# Patient Record
Sex: FEMALE | Race: WHITE | NOT HISPANIC OR LATINO
[De-identification: names, ages, dates, MRNs, and addresses within clinical notes are randomized per-mention and may not be internally consistent; named-entity substitution may affect disease eponyms.]

---
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
## 2023-03-06 DIAGNOSIS — Z76.0 ENCOUNTER FOR ISSUE OF REPEAT PRESCRIPTION: ICD-10-CM

## 2023-03-06 RX ORDER — NORETHINDRONE ACETATE AND ETHINYL ESTRADIOL 1MG-20(21)
1-20 KIT ORAL DAILY
Qty: 1 | Refills: 0 | Status: ACTIVE | COMMUNITY
Start: 2023-03-06 | End: 1900-01-01

## 2023-04-10 ENCOUNTER — LABORATORY RESULT (OUTPATIENT)
Age: 18
End: 2023-04-10

## 2023-04-10 ENCOUNTER — APPOINTMENT (OUTPATIENT)
Dept: OBGYN | Facility: CLINIC | Age: 18
End: 2023-04-10
Payer: COMMERCIAL

## 2023-04-10 VITALS
HEIGHT: 62 IN | BODY MASS INDEX: 22.08 KG/M2 | DIASTOLIC BLOOD PRESSURE: 75 MMHG | WEIGHT: 120 LBS | HEART RATE: 109 BPM | SYSTOLIC BLOOD PRESSURE: 112 MMHG

## 2023-04-10 DIAGNOSIS — B37.9 CANDIDIASIS, UNSPECIFIED: ICD-10-CM

## 2023-04-10 LAB
BILIRUB UR QL STRIP: NEGATIVE
CLARITY UR: CLEAR
COLLECTION METHOD: NORMAL
GLUCOSE UR-MCNC: NEGATIVE
HCG UR QL: 0.2 EU/DL
HGB UR QL STRIP.AUTO: NEGATIVE
KETONES UR-MCNC: NEGATIVE
LEUKOCYTE ESTERASE UR QL STRIP: NORMAL
NITRITE UR QL STRIP: NEGATIVE
PH UR STRIP: 6
PROT UR STRIP-MCNC: NEGATIVE
SP GR UR STRIP: <=1.005

## 2023-04-10 PROCEDURE — 81003 URINALYSIS AUTO W/O SCOPE: CPT | Mod: QW

## 2023-04-10 PROCEDURE — 99213 OFFICE O/P EST LOW 20 MIN: CPT

## 2023-04-10 NOTE — PHYSICAL EXAM
[Chaperone Present] : A chaperone was present in the examining room during all aspects of the physical examination [Appropriately responsive] : appropriately responsive [Alert] : alert [No Acute Distress] : no acute distress [Oriented x3] : oriented x3 [Labia Majora] : normal [Labia Minora] : normal [Normal] : normal [Discharge] : a  ~M vaginal discharge was present [Moderate] : moderate [White] : white [Cheesy] : cheesy [FreeTextEntry2] : discharge over external genitalia [FreeTextEntry4] : intact hymen [FreeTextEntry5] : not appreciated  [FreeTextEntry6] : no pelvic exam  , pt virginal

## 2023-04-10 NOTE — DISCUSSION/SUMMARY
[FreeTextEntry1] : continue OCPs as directed\par pt does not want to take spironolactone for excessive hair growth, she manages it with depilatory creams\par Mother was present for discussion only, not during PE\par Treated with diflcan today based oh pts symptoms and physical findings\par encouraged perineal hygiene

## 2023-04-10 NOTE — HISTORY OF PRESENT ILLNESS
[FreeTextEntry1] : pt c/o vaginal discharge for 2 weeks, associated with odor\par pt states the discharge is "green" in color\par she is not sexually active (virgin), and currently on OCPs for PCOS x 2 yrs\par pt is amenorrheic since starting pill except for some infrequent spotting\par  [Y] : Patient uses contraception [Oral Contraceptive] : uses oral contraception pills [N] : Patient denies prior pregnancies [LMPDate] : 12/7/22 [MensesFreq] : 28 [MensesLength] : 3 [MensesAmount] : spotting [No] : Patient does not have concerns regarding sex [Never active] : never active

## 2023-04-10 NOTE — REVIEW OF SYSTEMS
[Anxiety] : anxiety [Negative] : Heme/Lymph [de-identified] : taking anxiolytic through her pediatricians office

## 2023-04-11 ENCOUNTER — NON-APPOINTMENT (OUTPATIENT)
Age: 18
End: 2023-04-11

## 2023-04-11 PROBLEM — B37.9 YEAST INFECTION: Status: ACTIVE | Noted: 2023-04-11

## 2023-04-17 ENCOUNTER — NON-APPOINTMENT (OUTPATIENT)
Age: 18
End: 2023-04-17

## 2023-04-19 LAB
MYCOPLASMA HOMINIS CULTURE: NEGATIVE
UREAPLASMA CULTURE: NEGATIVE

## 2023-06-06 ENCOUNTER — NON-APPOINTMENT (OUTPATIENT)
Age: 18
End: 2023-06-06

## 2023-06-06 RX ORDER — NORETHINDRONE ACETATE AND ETHINYL ESTRADIOL 1MG-20(21)
1-20 KIT ORAL
Qty: 84 | Refills: 1 | Status: ACTIVE | COMMUNITY
Start: 2022-06-27 | End: 1900-01-01

## 2023-11-03 ENCOUNTER — NON-APPOINTMENT (OUTPATIENT)
Age: 18
End: 2023-11-03

## 2023-11-07 ENCOUNTER — NON-APPOINTMENT (OUTPATIENT)
Age: 18
End: 2023-11-07

## 2023-11-10 ENCOUNTER — RX RENEWAL (OUTPATIENT)
Age: 18
End: 2023-11-10

## 2023-11-20 DIAGNOSIS — M54.50 LOW BACK PAIN, UNSPECIFIED: ICD-10-CM

## 2023-11-20 DIAGNOSIS — R10.2 PELVIC AND PERINEAL PAIN: ICD-10-CM

## 2023-11-25 ENCOUNTER — OUTPATIENT (OUTPATIENT)
Dept: OUTPATIENT SERVICES | Facility: HOSPITAL | Age: 18
LOS: 1 days | End: 2023-11-25
Payer: COMMERCIAL

## 2023-11-25 ENCOUNTER — TRANSCRIPTION ENCOUNTER (OUTPATIENT)
Age: 18
End: 2023-11-25

## 2023-11-25 ENCOUNTER — RESULT REVIEW (OUTPATIENT)
Age: 18
End: 2023-11-25

## 2023-11-25 DIAGNOSIS — R10.2 PELVIC AND PERINEAL PAIN: ICD-10-CM

## 2023-11-25 DIAGNOSIS — Z00.8 ENCOUNTER FOR OTHER GENERAL EXAMINATION: ICD-10-CM

## 2023-11-25 PROCEDURE — 76856 US EXAM PELVIC COMPLETE: CPT

## 2023-11-25 PROCEDURE — 76856 US EXAM PELVIC COMPLETE: CPT | Mod: 26

## 2023-11-26 DIAGNOSIS — R10.2 PELVIC AND PERINEAL PAIN: ICD-10-CM

## 2023-11-28 ENCOUNTER — NON-APPOINTMENT (OUTPATIENT)
Age: 18
End: 2023-11-28

## 2023-12-26 ENCOUNTER — NON-APPOINTMENT (OUTPATIENT)
Age: 18
End: 2023-12-26

## 2024-05-06 ENCOUNTER — APPOINTMENT (OUTPATIENT)
Dept: OBGYN | Facility: CLINIC | Age: 19
End: 2024-05-06
Payer: COMMERCIAL

## 2024-05-06 VITALS
HEART RATE: 101 BPM | BODY MASS INDEX: 22.08 KG/M2 | WEIGHT: 120 LBS | DIASTOLIC BLOOD PRESSURE: 83 MMHG | HEIGHT: 62 IN | SYSTOLIC BLOOD PRESSURE: 118 MMHG

## 2024-05-06 DIAGNOSIS — N89.8 OTHER SPECIFIED NONINFLAMMATORY DISORDERS OF VAGINA: ICD-10-CM

## 2024-05-06 DIAGNOSIS — Z30.41 ENCOUNTER FOR SURVEILLANCE OF CONTRACEPTIVE PILLS: ICD-10-CM

## 2024-05-06 DIAGNOSIS — N91.1 SECONDARY AMENORRHEA: ICD-10-CM

## 2024-05-06 PROCEDURE — 99459 PELVIC EXAMINATION: CPT

## 2024-05-06 PROCEDURE — 99213 OFFICE O/P EST LOW 20 MIN: CPT

## 2024-05-06 RX ORDER — NORETHINDRONE ACETATE AND ETHINYL ESTRADIOL 1; 20 MG/1; UG/1
1-20 TABLET ORAL
Qty: 4 | Refills: 1 | Status: COMPLETED | COMMUNITY
Start: 2021-08-13 | End: 2024-05-06

## 2024-05-06 RX ORDER — NORETHINDRONE ACETATE AND ETHINYL ESTRADIOL AND FERROUS FUMARATE 1MG-20(21)
1-20 KIT ORAL
Qty: 84 | Refills: 3 | Status: COMPLETED | COMMUNITY
Start: 2023-11-10 | End: 2024-05-06

## 2024-05-06 RX ORDER — FLUCONAZOLE 150 MG/1
150 TABLET ORAL
Qty: 3 | Refills: 0 | Status: COMPLETED | COMMUNITY
Start: 2023-04-11 | End: 2024-05-06

## 2024-05-06 RX ORDER — CIPROFLOXACIN HYDROCHLORIDE 250 MG/1
250 TABLET, FILM COATED ORAL
Qty: 6 | Refills: 0 | Status: COMPLETED | COMMUNITY
Start: 2022-01-07 | End: 2024-05-06

## 2024-05-06 RX ORDER — NORETHINDRONE ACETATE AND ETHINYL ESTRADIOL 1; 20 MG/1; UG/1
1-20 TABLET ORAL DAILY
Qty: 3 | Refills: 1 | Status: COMPLETED | COMMUNITY
Start: 2022-01-03 | End: 2024-05-06

## 2024-05-06 RX ORDER — AMOXICILLIN AND CLAVULANATE POTASSIUM 500; 125 MG/1; MG/1
500-125 TABLET, FILM COATED ORAL
Qty: 14 | Refills: 1 | Status: COMPLETED | COMMUNITY
Start: 2022-12-22 | End: 2024-05-06

## 2024-05-06 NOTE — PLAN
[FreeTextEntry1] : Pt doing well on OCPs. Instructed pt to call if she has any concerns or needs and to f/u in one year. Vaginal culture sent today. Pt understands that if the culture comes back positive, one of the nurses will call and let her know.

## 2024-05-06 NOTE — PHYSICAL EXAM
[Chaperone Present] : A chaperone was present in the examining room during all aspects of the physical examination [[id number]] : A chaperone was present during the pelvic exam. [Appropriately responsive] : appropriately responsive [Alert] : alert [No Acute Distress] : no acute distress [Oriented x3] : oriented x3 [FreeTextEntry2] : BRANT Joseph [Labia Majora] : normal [Labia Minora] : normal [Normal] : normal [FreeTextEntry1] : inspection only [FreeTextEntry4] : c/o discharge [FreeTextEntry5] : no exam pt is virginal

## 2024-05-06 NOTE — HISTORY OF PRESENT ILLNESS
[No] : Patient does not have concerns regarding sex [Never active] : never active [FreeTextEntry1] : Irasema is here for a follow up visit for OCP use. She is not sexually active (virgin) and is currently on OCPs for PCOS. She reports feeling well on OCPs. She reports amenorrhea since January due to the OCPs. She is c/o a white vaginal discharge that has a foul odor. She reports mild itching associated with the discharge. She has had no changes in medical history or medications since her last visit. [Y] : Patient uses contraception [Oral Contraceptive] : uses oral contraception pills [N] : Patient denies prior pregnancies [LMPDate] : 1/2024 [TextBox_6] : 01/05/24

## 2024-05-12 ENCOUNTER — NON-APPOINTMENT (OUTPATIENT)
Age: 19
End: 2024-05-12

## 2024-05-13 RX ORDER — NORETHINDRONE ACETATE AND ETHINYL ESTRADIOL AND FERROUS FUMARATE 1MG-20(21)
1-20 KIT ORAL DAILY
Qty: 84 | Refills: 3 | Status: ACTIVE | COMMUNITY
Start: 2024-05-06 | End: 1900-01-01

## 2025-04-16 ENCOUNTER — RX RENEWAL (OUTPATIENT)
Age: 20
End: 2025-04-16

## 2025-04-21 ENCOUNTER — NON-APPOINTMENT (OUTPATIENT)
Age: 20
End: 2025-04-21

## 2025-05-22 ENCOUNTER — NON-APPOINTMENT (OUTPATIENT)
Age: 20
End: 2025-05-22

## 2025-05-22 ENCOUNTER — APPOINTMENT (OUTPATIENT)
Dept: OBGYN | Facility: CLINIC | Age: 20
End: 2025-05-22
Payer: COMMERCIAL

## 2025-05-22 VITALS
DIASTOLIC BLOOD PRESSURE: 78 MMHG | BODY MASS INDEX: 22.08 KG/M2 | WEIGHT: 120 LBS | HEIGHT: 62 IN | SYSTOLIC BLOOD PRESSURE: 113 MMHG | HEART RATE: 72 BPM

## 2025-05-22 DIAGNOSIS — N92.6 IRREGULAR MENSTRUATION, UNSPECIFIED: ICD-10-CM

## 2025-05-22 DIAGNOSIS — Z30.41 ENCOUNTER FOR SURVEILLANCE OF CONTRACEPTIVE PILLS: ICD-10-CM

## 2025-05-22 DIAGNOSIS — L68.0 HIRSUTISM: ICD-10-CM

## 2025-05-22 DIAGNOSIS — N89.8 OTHER SPECIFIED NONINFLAMMATORY DISORDERS OF VAGINA: ICD-10-CM

## 2025-05-22 LAB
BILIRUB UR QL STRIP: NEGATIVE
CLARITY UR: CLEAR
COLLECTION METHOD: NORMAL
GLUCOSE UR-MCNC: NEGATIVE
HCG UR QL: 0.2 EU/DL
HGB UR QL STRIP.AUTO: NEGATIVE
KETONES UR-MCNC: NEGATIVE
LEUKOCYTE ESTERASE UR QL STRIP: ABNORMAL
NITRITE UR QL STRIP: NEGATIVE
PH UR STRIP: 7
PROT UR STRIP-MCNC: NEGATIVE
SP GR UR STRIP: 1.01

## 2025-05-22 PROCEDURE — 99213 OFFICE O/P EST LOW 20 MIN: CPT

## 2025-05-22 PROCEDURE — 99459 PELVIC EXAMINATION: CPT | Mod: NC

## 2025-05-22 PROCEDURE — 81003 URINALYSIS AUTO W/O SCOPE: CPT | Mod: QW

## 2025-05-30 ENCOUNTER — LABORATORY RESULT (OUTPATIENT)
Age: 20
End: 2025-05-30

## 2025-05-31 LAB
TESTOST FREE SERPL-MCNC: 0.9 PG/ML
TESTOST SERPL-MCNC: 28.5 NG/DL

## 2025-06-04 ENCOUNTER — NON-APPOINTMENT (OUTPATIENT)
Age: 20
End: 2025-06-04